# Patient Record
Sex: FEMALE | Race: WHITE | URBAN - METROPOLITAN AREA
[De-identification: names, ages, dates, MRNs, and addresses within clinical notes are randomized per-mention and may not be internally consistent; named-entity substitution may affect disease eponyms.]

---

## 2020-06-03 ENCOUNTER — CLAIMS CREATED FROM THE CLAIM WINDOW (OUTPATIENT)
Dept: URBAN - METROPOLITAN AREA TELEHEALTH 2 | Facility: TELEHEALTH | Age: 65
End: 2020-06-03
Payer: MEDICARE

## 2020-06-03 ENCOUNTER — OFFICE VISIT (OUTPATIENT)
Dept: URBAN - METROPOLITAN AREA TELEHEALTH 2 | Facility: TELEHEALTH | Age: 65
End: 2020-06-03

## 2020-06-03 DIAGNOSIS — K51.919 COLITIS, ULCERATIVE: ICD-10-CM

## 2020-06-03 DIAGNOSIS — K75.81 NASH WITH CIRRHOSIS: ICD-10-CM

## 2020-06-03 DIAGNOSIS — R19.7 DIARRHEA: ICD-10-CM

## 2020-06-03 PROCEDURE — G8427 DOCREV CUR MEDS BY ELIG CLIN: HCPCS | Performed by: INTERNAL MEDICINE

## 2020-06-03 PROCEDURE — G8417 CALC BMI ABV UP PARAM F/U: HCPCS | Performed by: INTERNAL MEDICINE

## 2020-06-03 PROCEDURE — G9903 PT SCRN TBCO ID AS NON USER: HCPCS | Performed by: INTERNAL MEDICINE

## 2020-06-03 PROCEDURE — 3017F COLORECTAL CA SCREEN DOC REV: CPT | Performed by: INTERNAL MEDICINE

## 2020-06-03 PROCEDURE — 99214 OFFICE O/P EST MOD 30 MIN: CPT | Performed by: INTERNAL MEDICINE

## 2020-06-03 RX ORDER — AMANTADINE HYDROCHLORIDE 100 MG/1
TAKE 1 TABLET (100 MG) BY ORAL ROUTE 2 TIMES PER DAY TABLET ORAL 2
Qty: 0 | Refills: 0 | COMMUNITY
Start: 1900-01-01

## 2020-06-03 RX ORDER — METOPROLOL SUCCINATE 25 MG/1
TAKE 1 TABLET (25 MG) BY ORAL ROUTE ONCE DAILY TABLET, EXTENDED RELEASE ORAL 1
Qty: 0 | Refills: 0 | COMMUNITY
Start: 1900-01-01

## 2020-06-03 RX ORDER — VEDOLIZUMAB 300 MG/5ML
INFUSE 300 MG OVER 30 MINUTE(S) BY INTRAVENOUS ROUTE EVERY 8 WEEKS INJECTION, POWDER, LYOPHILIZED, FOR SOLUTION INTRAVENOUS
Qty: 1 | Refills: 0 | COMMUNITY
Start: 1900-01-01

## 2020-06-03 RX ORDER — BUDESONIDE 9 MG/1
TAKE 1 TABLET (9 MG) BY ORAL ROUTE ONCE DAILY IN THE MORNING SWALLOWING WHOLE WITH WATER. DO NOT BREAK, CRUSH, DISSOLVE AND/OR CHEW. FOR 30 DAYS TABLET, EXTENDED RELEASE ORAL 1
Qty: 30 | Refills: 0 | COMMUNITY
Start: 2020-05-28 | End: 2020-06-27

## 2020-06-03 RX ORDER — USTEKINUMAB 130 MG/26ML
AS DIRECTED SOLUTION INTRAVENOUS
OUTPATIENT
Start: 2020-06-03

## 2020-06-03 RX ORDER — LEVOTHYROXINE SODIUM 112 UG/1
TAKE 1 TABLET (112 MCG) BY ORAL ROUTE ONCE DAILY TABLET ORAL 1
Qty: 0 | Refills: 0 | COMMUNITY
Start: 1900-01-01

## 2020-06-03 RX ORDER — LEVODOPA AND CARBIDOPA 145; 36.25 MG/1; MG/1
TAKE 1 CAPSULE BY ORAL ROUTE 3 TIMES PER DAY CAPSULE, EXTENDED RELEASE ORAL
Qty: 0 | Refills: 0 | COMMUNITY
Start: 1900-01-01

## 2020-06-03 RX ORDER — VENLAFAXINE HYDROCHLORIDE 75 MG/1
TAKE 1 CAPSULE (75 MG) BY ORAL ROUTE ONCE DAILY WITH FOOD CAPSULE, EXTENDED RELEASE ORAL 1
Qty: 0 | Refills: 0 | COMMUNITY
Start: 1900-01-01

## 2020-06-03 RX ORDER — MESALAMINE 1.2 G/1
TAKE 4 TABLETS (4.8 GRAM) BY ORAL ROUTE ONCE DAILY WITH A MEAL FOR 30 DAYS TABLET, DELAYED RELEASE ORAL 1
Qty: 120 | Refills: 2 | COMMUNITY
Start: 2020-05-11 | End: 2020-08-09

## 2020-06-03 RX ORDER — ROSUVASTATIN CALCIUM 20 MG
TAKE 1 TABLET (20 MG) BY ORAL ROUTE ONCE DAILY TABLET ORAL 1
Qty: 0 | Refills: 0 | COMMUNITY
Start: 1900-01-01

## 2020-06-03 NOTE — HPI-TODAY'S VISIT:
64 yo lady pt of Dr Rachael Carmen.  She is a pt of Dr Mlelisa Stinson who I'm seeing on an urgent basis.  She states was diagnosed with ulcerative colitis by Dr Josep Whitney in 10/2019. ( colonoscopy and path report not available for review) Her main symptoms were abdominal pain and diarrhea with mucus in stool. She denies blood in stool.  She was put on Prednisone.  She then saw Dr Stinson as she wanted to see a different physician and she is part of her support group - at [facility].  She saw Dr Stinson who changed her to mesalamine which has not helped. She takes 1.2 g 4 tabs once a day in the am. She has taken this for 16 days.  She is still having multiple BMs, she has had 5 BMs today so far, 10 yesterday. No blood in stool. "No matter what I eat, they come out like chocolate syrup". NO abdominal pain, "just a little cramping from time to time". She stopped prednisone about 3 weeks ago after a taper "as soon as I am off prednisone I flare again". On prednisone, symptoms are better. Has about 2 BMs a day, still liquid stools. rarely formed.  She also shares that she has had 4 infusions of Entyvvio. "I do not think that it has done much for me". Her next infusion is due in June. Per Dr Reveles's note, the plan was to switch to Stelara.  She states she has had stool studies done 3 times for c.diff " I was negative all 3 times". last time was March.  She is worried as she is scheduled for brain surgery on June 10th for direct brain stimulation. "I just dont want to have diarrhea in the hospital".  History Of Present Illness    Sept 2019 was diagnosed wtih steroid therapy and diet modification.  Pt reports that her symptoms began after gallbladder removed.  Pt reports that she has not had a good response. Pt reports that she is unclear if to whether the entire colon. Gas increased since starting the entyvio.  Patient reports that she has on a good day approx 2x/day. Pt reports that she was she just placed on the questran. Pt reports that she has been tested for April for deep brain stimulation .  No EIM

## 2020-06-05 ENCOUNTER — TELEPHONE ENCOUNTER (OUTPATIENT)
Dept: URBAN - METROPOLITAN AREA CLINIC 54 | Facility: CLINIC | Age: 65
End: 2020-06-05

## 2020-06-18 ENCOUNTER — LAB OUTSIDE AN ENCOUNTER (OUTPATIENT)
Dept: URBAN - METROPOLITAN AREA CLINIC 54 | Facility: CLINIC | Age: 65
End: 2020-06-18

## 2020-06-21 LAB
ACTIN (SMOOTH MUSCLE) ANTIBODY: 14
DEAMIDATED GLIADIN ABS, IGA: 13
DEAMIDATED GLIADIN ABS, IGG: 4
HCV AB: <0.1
HEP A AB, TOTAL: POSITIVE
HEP B CORE AB, TOT: NEGATIVE
HEPATITIS B SURF AB QUANT: 6.1
IMMUNOGLOBULIN A, QN, SERUM: 492
INTERPRETATION:: (no result)
MITOCHONDRIAL (M2) ANTIBODY: <20
T-TRANSGLUTAMINASE (TTG) IGA: 3
T-TRANSGLUTAMINASE (TTG) IGG: <2

## 2020-06-22 ENCOUNTER — WEB ENCOUNTER (OUTPATIENT)
Dept: URBAN - METROPOLITAN AREA CLINIC 54 | Facility: CLINIC | Age: 65
End: 2020-06-22

## 2020-06-24 ENCOUNTER — TELEPHONE ENCOUNTER (OUTPATIENT)
Dept: URBAN - METROPOLITAN AREA CLINIC 54 | Facility: CLINIC | Age: 65
End: 2020-06-24

## 2020-06-24 LAB
C DIFFICILE TOXIN GENE NAA: NEGATIVE
CALPROTECTIN, FECAL: 508
CAMPYLOBACTER CULTURE: (no result)
E COLI SHIGA TOXIN EIA: NEGATIVE
Lab: (no result)
OVA + PARASITE EXAM: (no result)
SALMONELLA/SHIGELLA SCREEN: (no result)
WHITE BLOOD CELLS (WBC), STOOL: (no result)

## 2020-07-07 ENCOUNTER — TELEPHONE ENCOUNTER (OUTPATIENT)
Dept: URBAN - METROPOLITAN AREA CLINIC 54 | Facility: CLINIC | Age: 65
End: 2020-07-07

## 2020-07-07 ENCOUNTER — OFFICE VISIT (OUTPATIENT)
Dept: URBAN - METROPOLITAN AREA TELEHEALTH 2 | Facility: TELEHEALTH | Age: 65
End: 2020-07-07

## 2020-07-07 ENCOUNTER — OFFICE VISIT (OUTPATIENT)
Dept: URBAN - METROPOLITAN AREA CLINIC 54 | Facility: CLINIC | Age: 65
End: 2020-07-07

## 2020-07-07 RX ORDER — USTEKINUMAB 130 MG/26ML
AS DIRECTED SOLUTION INTRAVENOUS
COMMUNITY
Start: 2020-06-03

## 2020-07-07 RX ORDER — ROSUVASTATIN CALCIUM 20 MG
TAKE 1 TABLET (20 MG) BY ORAL ROUTE ONCE DAILY TABLET ORAL 1
Qty: 0 | Refills: 0 | COMMUNITY
Start: 1900-01-01

## 2020-07-07 RX ORDER — LEVOTHYROXINE SODIUM 112 UG/1
TAKE 1 TABLET (112 MCG) BY ORAL ROUTE ONCE DAILY TABLET ORAL 1
Qty: 0 | Refills: 0 | COMMUNITY
Start: 1900-01-01

## 2020-07-07 RX ORDER — VENLAFAXINE HYDROCHLORIDE 75 MG/1
TAKE 1 CAPSULE (75 MG) BY ORAL ROUTE ONCE DAILY WITH FOOD CAPSULE, EXTENDED RELEASE ORAL 1
Qty: 0 | Refills: 0 | COMMUNITY
Start: 1900-01-01

## 2020-07-07 RX ORDER — VEDOLIZUMAB 300 MG/5ML
INFUSE 300 MG OVER 30 MINUTE(S) BY INTRAVENOUS ROUTE EVERY 8 WEEKS INJECTION, POWDER, LYOPHILIZED, FOR SOLUTION INTRAVENOUS
Qty: 1 | Refills: 0 | COMMUNITY
Start: 1900-01-01

## 2020-07-07 RX ORDER — AMANTADINE HYDROCHLORIDE 100 MG/1
TAKE 1 TABLET (100 MG) BY ORAL ROUTE 2 TIMES PER DAY TABLET ORAL 2
Qty: 0 | Refills: 0 | COMMUNITY
Start: 1900-01-01

## 2020-07-07 RX ORDER — MESALAMINE 1.2 G/1
TAKE 4 TABLETS (4.8 GRAM) BY ORAL ROUTE ONCE DAILY WITH A MEAL FOR 30 DAYS TABLET, DELAYED RELEASE ORAL 1
Qty: 120 | Refills: 2 | COMMUNITY
Start: 2020-05-11 | End: 2020-08-09

## 2020-07-07 RX ORDER — METOPROLOL SUCCINATE 25 MG/1
TAKE 1 TABLET (25 MG) BY ORAL ROUTE ONCE DAILY TABLET, EXTENDED RELEASE ORAL 1
Qty: 0 | Refills: 0 | COMMUNITY
Start: 1900-01-01

## 2020-07-07 RX ORDER — LEVODOPA AND CARBIDOPA 145; 36.25 MG/1; MG/1
TAKE 1 CAPSULE BY ORAL ROUTE 3 TIMES PER DAY CAPSULE, EXTENDED RELEASE ORAL
Qty: 0 | Refills: 0 | COMMUNITY
Start: 1900-01-01

## 2020-07-07 RX ORDER — METRONIDAZOLE 500 MG/1
1 TABLET TABLET ORAL TWICE A DAY
Qty: 20 | OUTPATIENT
Start: 2020-07-08 | End: 2020-07-18

## 2020-07-15 ENCOUNTER — OFFICE VISIT (OUTPATIENT)
Dept: URBAN - METROPOLITAN AREA CLINIC 54 | Facility: CLINIC | Age: 65
End: 2020-07-15

## 2020-07-17 ENCOUNTER — OFFICE VISIT (OUTPATIENT)
Dept: URBAN - METROPOLITAN AREA TELEHEALTH 2 | Facility: TELEHEALTH | Age: 65
End: 2020-07-17
Payer: MEDICARE

## 2020-07-17 DIAGNOSIS — K51.919 COLITIS, ULCERATIVE: ICD-10-CM

## 2020-07-17 DIAGNOSIS — R19.7 DIARRHEA: ICD-10-CM

## 2020-07-17 DIAGNOSIS — K75.81 NASH WITH CIRRHOSIS: ICD-10-CM

## 2020-07-17 PROCEDURE — G8427 DOCREV CUR MEDS BY ELIG CLIN: HCPCS | Performed by: INTERNAL MEDICINE

## 2020-07-17 PROCEDURE — 1036F TOBACCO NON-USER: CPT | Performed by: INTERNAL MEDICINE

## 2020-07-17 PROCEDURE — G9903 PT SCRN TBCO ID AS NON USER: HCPCS | Performed by: INTERNAL MEDICINE

## 2020-07-17 PROCEDURE — 3017F COLORECTAL CA SCREEN DOC REV: CPT | Performed by: INTERNAL MEDICINE

## 2020-07-17 PROCEDURE — 99214 OFFICE O/P EST MOD 30 MIN: CPT | Performed by: INTERNAL MEDICINE

## 2020-07-17 RX ORDER — MESALAMINE 1.2 G/1
TAKE 4 TABLETS (4.8 GRAM) BY ORAL ROUTE ONCE DAILY WITH A MEAL FOR 30 DAYS TABLET, DELAYED RELEASE ORAL 1
Qty: 120 | Refills: 2 | COMMUNITY
Start: 2020-05-11 | End: 2020-08-09

## 2020-07-17 RX ORDER — METOPROLOL SUCCINATE 25 MG/1
TAKE 1 TABLET (25 MG) BY ORAL ROUTE ONCE DAILY TABLET, EXTENDED RELEASE ORAL 1
Qty: 0 | Refills: 0 | COMMUNITY
Start: 1900-01-01

## 2020-07-17 RX ORDER — LEVOTHYROXINE SODIUM 112 UG/1
TAKE 1 TABLET (112 MCG) BY ORAL ROUTE ONCE DAILY TABLET ORAL 1
Qty: 0 | Refills: 0 | COMMUNITY
Start: 1900-01-01

## 2020-07-17 RX ORDER — METRONIDAZOLE 500 MG/1
1 TABLET TABLET ORAL TWICE A DAY
Qty: 20 | Status: ACTIVE | COMMUNITY
Start: 2020-07-08 | End: 2020-07-18

## 2020-07-17 RX ORDER — VEDOLIZUMAB 300 MG/5ML
INFUSE 300 MG OVER 30 MINUTE(S) BY INTRAVENOUS ROUTE EVERY 8 WEEKS INJECTION, POWDER, LYOPHILIZED, FOR SOLUTION INTRAVENOUS
Qty: 1 | Refills: 0 | COMMUNITY
Start: 1900-01-01

## 2020-07-17 RX ORDER — ROSUVASTATIN CALCIUM 20 MG
TAKE 1 TABLET (20 MG) BY ORAL ROUTE ONCE DAILY TABLET ORAL 1
Qty: 0 | Refills: 0 | COMMUNITY
Start: 1900-01-01

## 2020-07-17 RX ORDER — AMANTADINE HYDROCHLORIDE 100 MG/1
TAKE 1 TABLET (100 MG) BY ORAL ROUTE 2 TIMES PER DAY TABLET ORAL 2
Qty: 0 | Refills: 0 | COMMUNITY
Start: 1900-01-01

## 2020-07-17 RX ORDER — USTEKINUMAB 130 MG/26ML
AS DIRECTED SOLUTION INTRAVENOUS
COMMUNITY
Start: 2020-06-03

## 2020-07-17 RX ORDER — VENLAFAXINE HYDROCHLORIDE 75 MG/1
TAKE 1 CAPSULE (75 MG) BY ORAL ROUTE ONCE DAILY WITH FOOD CAPSULE, EXTENDED RELEASE ORAL 1
Qty: 0 | Refills: 0 | COMMUNITY
Start: 1900-01-01

## 2020-07-17 RX ORDER — LEVODOPA AND CARBIDOPA 145; 36.25 MG/1; MG/1
TAKE 1 CAPSULE BY ORAL ROUTE 3 TIMES PER DAY CAPSULE, EXTENDED RELEASE ORAL
Qty: 0 | Refills: 0 | COMMUNITY
Start: 1900-01-01

## 2020-07-27 ENCOUNTER — TELEPHONE ENCOUNTER (OUTPATIENT)
Dept: URBAN - METROPOLITAN AREA CLINIC 54 | Facility: CLINIC | Age: 65
End: 2020-07-27

## 2020-07-29 ENCOUNTER — WEB ENCOUNTER (OUTPATIENT)
Dept: URBAN - METROPOLITAN AREA CLINIC 92 | Facility: CLINIC | Age: 65
End: 2020-07-29

## 2020-08-13 ENCOUNTER — OFFICE VISIT (OUTPATIENT)
Dept: URBAN - METROPOLITAN AREA CLINIC 97 | Facility: CLINIC | Age: 65
End: 2020-08-13

## 2020-08-19 ENCOUNTER — OFFICE VISIT (OUTPATIENT)
Dept: URBAN - METROPOLITAN AREA CLINIC 97 | Facility: CLINIC | Age: 65
End: 2020-08-19

## 2020-08-20 ENCOUNTER — OFFICE VISIT (OUTPATIENT)
Dept: URBAN - METROPOLITAN AREA CLINIC 97 | Facility: CLINIC | Age: 65
End: 2020-08-20

## 2020-08-24 ENCOUNTER — CLAIMS CREATED FROM THE CLAIM WINDOW (OUTPATIENT)
Dept: URBAN - METROPOLITAN AREA CLINIC 97 | Facility: CLINIC | Age: 65
End: 2020-08-24
Payer: MEDICARE

## 2020-08-24 ENCOUNTER — CLAIMS CREATED FROM THE CLAIM WINDOW (OUTPATIENT)
Dept: URBAN - METROPOLITAN AREA CLINIC 97 | Facility: CLINIC | Age: 65
End: 2020-08-24

## 2020-08-24 ENCOUNTER — OFFICE VISIT (OUTPATIENT)
Dept: URBAN - METROPOLITAN AREA CLINIC 97 | Facility: CLINIC | Age: 65
End: 2020-08-24

## 2020-08-24 DIAGNOSIS — K51.80 CHRONIC PANCOLONIC ULCERATIVE COLITIS: ICD-10-CM

## 2020-08-24 PROCEDURE — 96413 CHEMO IV INFUSION 1 HR: CPT | Performed by: INTERNAL MEDICINE

## 2020-08-24 RX ORDER — LEVODOPA AND CARBIDOPA 145; 36.25 MG/1; MG/1
TAKE 1 CAPSULE BY ORAL ROUTE 3 TIMES PER DAY CAPSULE, EXTENDED RELEASE ORAL
Qty: 0 | Refills: 0 | COMMUNITY
Start: 1900-01-01

## 2020-08-24 RX ORDER — VENLAFAXINE HYDROCHLORIDE 75 MG/1
TAKE 1 CAPSULE (75 MG) BY ORAL ROUTE ONCE DAILY WITH FOOD CAPSULE, EXTENDED RELEASE ORAL 1
Qty: 0 | Refills: 0 | COMMUNITY
Start: 1900-01-01

## 2020-08-24 RX ORDER — METOPROLOL SUCCINATE 25 MG/1
TAKE 1 TABLET (25 MG) BY ORAL ROUTE ONCE DAILY TABLET, EXTENDED RELEASE ORAL 1
Qty: 0 | Refills: 0 | COMMUNITY
Start: 1900-01-01

## 2020-08-24 RX ORDER — AMANTADINE HYDROCHLORIDE 100 MG/1
TAKE 1 TABLET (100 MG) BY ORAL ROUTE 2 TIMES PER DAY TABLET ORAL 2
Qty: 0 | Refills: 0 | COMMUNITY
Start: 1900-01-01

## 2020-08-24 RX ORDER — ROSUVASTATIN CALCIUM 20 MG
TAKE 1 TABLET (20 MG) BY ORAL ROUTE ONCE DAILY TABLET ORAL 1
Qty: 0 | Refills: 0 | COMMUNITY
Start: 1900-01-01

## 2020-08-24 RX ORDER — LEVOTHYROXINE SODIUM 112 UG/1
TAKE 1 TABLET (112 MCG) BY ORAL ROUTE ONCE DAILY TABLET ORAL 1
Qty: 0 | Refills: 0 | COMMUNITY
Start: 1900-01-01

## 2020-08-24 RX ORDER — USTEKINUMAB 130 MG/26ML
AS DIRECTED SOLUTION INTRAVENOUS
COMMUNITY
Start: 2020-06-03

## 2020-08-24 RX ORDER — VEDOLIZUMAB 300 MG/5ML
INFUSE 300 MG OVER 30 MINUTE(S) BY INTRAVENOUS ROUTE EVERY 8 WEEKS INJECTION, POWDER, LYOPHILIZED, FOR SOLUTION INTRAVENOUS
Qty: 1 | Refills: 0 | COMMUNITY
Start: 1900-01-01

## 2020-09-23 ENCOUNTER — WEB ENCOUNTER (OUTPATIENT)
Dept: URBAN - METROPOLITAN AREA CLINIC 92 | Facility: CLINIC | Age: 65
End: 2020-09-23

## 2020-09-23 ENCOUNTER — TELEPHONE ENCOUNTER (OUTPATIENT)
Dept: URBAN - METROPOLITAN AREA CLINIC 54 | Facility: CLINIC | Age: 65
End: 2020-09-23

## 2020-09-23 ENCOUNTER — OFFICE VISIT (OUTPATIENT)
Dept: URBAN - METROPOLITAN AREA SURGERY CENTER 14 | Facility: SURGERY CENTER | Age: 65
End: 2020-09-23
Payer: MEDICARE

## 2020-09-23 DIAGNOSIS — K75.81 CHRONIC STEATOHEPATITIS, NONALCOHOLIC: ICD-10-CM

## 2020-09-23 PROCEDURE — 992 APS NON BILLABLE: Performed by: INTERNAL MEDICINE

## 2020-09-23 RX ORDER — LEVODOPA AND CARBIDOPA 145; 36.25 MG/1; MG/1
TAKE 1 CAPSULE BY ORAL ROUTE 3 TIMES PER DAY CAPSULE, EXTENDED RELEASE ORAL
Qty: 0 | Refills: 0 | COMMUNITY
Start: 1900-01-01

## 2020-09-23 RX ORDER — USTEKINUMAB 90 MG/ML
AS DIRECTED EVERY 8 WEEKS INJECTION, SOLUTION SUBCUTANEOUS
Qty: 1 | Refills: 6 | OUTPATIENT

## 2020-09-23 RX ORDER — LEVOTHYROXINE SODIUM 112 UG/1
TAKE 1 TABLET (112 MCG) BY ORAL ROUTE ONCE DAILY TABLET ORAL 1
Qty: 0 | Refills: 0 | COMMUNITY
Start: 1900-01-01

## 2020-09-23 RX ORDER — VEDOLIZUMAB 300 MG/5ML
INFUSE 300 MG OVER 30 MINUTE(S) BY INTRAVENOUS ROUTE EVERY 8 WEEKS INJECTION, POWDER, LYOPHILIZED, FOR SOLUTION INTRAVENOUS
Qty: 1 | Refills: 0 | COMMUNITY
Start: 1900-01-01

## 2020-09-23 RX ORDER — AMANTADINE HYDROCHLORIDE 100 MG/1
TAKE 1 TABLET (100 MG) BY ORAL ROUTE 2 TIMES PER DAY TABLET ORAL 2
Qty: 0 | Refills: 0 | COMMUNITY
Start: 1900-01-01

## 2020-09-23 RX ORDER — ROSUVASTATIN CALCIUM 20 MG
TAKE 1 TABLET (20 MG) BY ORAL ROUTE ONCE DAILY TABLET ORAL 1
Qty: 0 | Refills: 0 | COMMUNITY
Start: 1900-01-01

## 2020-09-23 RX ORDER — METOPROLOL SUCCINATE 25 MG/1
TAKE 1 TABLET (25 MG) BY ORAL ROUTE ONCE DAILY TABLET, EXTENDED RELEASE ORAL 1
Qty: 0 | Refills: 0 | COMMUNITY
Start: 1900-01-01

## 2020-09-23 RX ORDER — VENLAFAXINE HYDROCHLORIDE 75 MG/1
TAKE 1 CAPSULE (75 MG) BY ORAL ROUTE ONCE DAILY WITH FOOD CAPSULE, EXTENDED RELEASE ORAL 1
Qty: 0 | Refills: 0 | COMMUNITY
Start: 1900-01-01

## 2020-09-23 RX ORDER — USTEKINUMAB 130 MG/26ML
AS DIRECTED SOLUTION INTRAVENOUS
COMMUNITY
Start: 2020-06-03

## 2020-09-29 ENCOUNTER — TELEPHONE ENCOUNTER (OUTPATIENT)
Dept: URBAN - METROPOLITAN AREA CLINIC 92 | Facility: CLINIC | Age: 65
End: 2020-09-29

## 2020-10-09 ENCOUNTER — TELEPHONE ENCOUNTER (OUTPATIENT)
Dept: URBAN - METROPOLITAN AREA CLINIC 54 | Facility: CLINIC | Age: 65
End: 2020-10-09

## 2020-10-12 ENCOUNTER — WEB ENCOUNTER (OUTPATIENT)
Dept: URBAN - METROPOLITAN AREA CLINIC 54 | Facility: CLINIC | Age: 65
End: 2020-10-12

## 2020-10-15 ENCOUNTER — LAB OUTSIDE AN ENCOUNTER (OUTPATIENT)
Dept: URBAN - METROPOLITAN AREA CLINIC 52 | Facility: CLINIC | Age: 65
End: 2020-10-15

## 2020-10-15 ENCOUNTER — OFFICE VISIT (OUTPATIENT)
Dept: URBAN - METROPOLITAN AREA CLINIC 52 | Facility: CLINIC | Age: 65
End: 2020-10-15
Payer: MEDICARE

## 2020-10-15 ENCOUNTER — WEB ENCOUNTER (OUTPATIENT)
Dept: URBAN - METROPOLITAN AREA CLINIC 52 | Facility: CLINIC | Age: 65
End: 2020-10-15

## 2020-10-15 DIAGNOSIS — G20 PARKINSON DISEASE: ICD-10-CM

## 2020-10-15 DIAGNOSIS — R10.11 RUQ PAIN: ICD-10-CM

## 2020-10-15 DIAGNOSIS — R93.5 ABNORMAL CT OF THE ABDOMEN: ICD-10-CM

## 2020-10-15 DIAGNOSIS — K74.60 CIRRHOSIS: ICD-10-CM

## 2020-10-15 DIAGNOSIS — K51.90 ULCERATIVE COLITIS: ICD-10-CM

## 2020-10-15 PROCEDURE — G8419 CALC BMI OUT NRM PARAM NOF/U: HCPCS | Performed by: INTERNAL MEDICINE

## 2020-10-15 PROCEDURE — 1036F TOBACCO NON-USER: CPT | Performed by: INTERNAL MEDICINE

## 2020-10-15 PROCEDURE — 3017F COLORECTAL CA SCREEN DOC REV: CPT | Performed by: INTERNAL MEDICINE

## 2020-10-15 PROCEDURE — G8427 DOCREV CUR MEDS BY ELIG CLIN: HCPCS | Performed by: INTERNAL MEDICINE

## 2020-10-15 PROCEDURE — 99214 OFFICE O/P EST MOD 30 MIN: CPT | Performed by: INTERNAL MEDICINE

## 2020-10-15 RX ORDER — VEDOLIZUMAB 300 MG/5ML
INFUSE 300 MG OVER 30 MINUTE(S) BY INTRAVENOUS ROUTE EVERY 8 WEEKS INJECTION, POWDER, LYOPHILIZED, FOR SOLUTION INTRAVENOUS
Qty: 1 | Refills: 0 | Status: DISCONTINUED | COMMUNITY
Start: 1900-01-01

## 2020-10-15 RX ORDER — USTEKINUMAB 90 MG/ML
AS DIRECTED EVERY 8 WEEKS INJECTION, SOLUTION SUBCUTANEOUS
Qty: 1 | Refills: 6 | Status: ACTIVE | COMMUNITY

## 2020-10-15 RX ORDER — AMANTADINE HYDROCHLORIDE 100 MG/1
TAKE 1 TABLET (100 MG) BY ORAL ROUTE 2 TIMES PER DAY TABLET ORAL 2
Qty: 0 | Refills: 0 | Status: DISCONTINUED | COMMUNITY
Start: 1900-01-01

## 2020-10-15 RX ORDER — VENLAFAXINE HYDROCHLORIDE 75 MG/1
TAKE 1 CAPSULE (75 MG) BY ORAL ROUTE ONCE DAILY WITH FOOD CAPSULE, EXTENDED RELEASE ORAL 1
Qty: 0 | Refills: 0 | COMMUNITY
Start: 1900-01-01

## 2020-10-15 RX ORDER — USTEKINUMAB 130 MG/26ML
AS DIRECTED SOLUTION INTRAVENOUS
Status: ACTIVE | COMMUNITY
Start: 2020-06-03

## 2020-10-15 RX ORDER — LEVODOPA AND CARBIDOPA 145; 36.25 MG/1; MG/1
TAKE 1 CAPSULE BY ORAL ROUTE 3 TIMES PER DAY CAPSULE, EXTENDED RELEASE ORAL
Qty: 0 | Refills: 0 | Status: DISCONTINUED | COMMUNITY
Start: 1900-01-01

## 2020-10-15 RX ORDER — METOPROLOL SUCCINATE 25 MG/1
TAKE 1 TABLET (25 MG) BY ORAL ROUTE ONCE DAILY TABLET, EXTENDED RELEASE ORAL 1
Qty: 0 | Refills: 0 | Status: ACTIVE | COMMUNITY
Start: 1900-01-01

## 2020-10-15 RX ORDER — ROSUVASTATIN CALCIUM 20 MG
TAKE 1 TABLET (20 MG) BY ORAL ROUTE ONCE DAILY TABLET ORAL 1
Qty: 0 | Refills: 0 | Status: ACTIVE | COMMUNITY
Start: 1900-01-01

## 2020-10-15 RX ORDER — LEVOTHYROXINE SODIUM 112 UG/1
TAKE 1 TABLET (112 MCG) BY ORAL ROUTE ONCE DAILY TABLET ORAL 1
Qty: 0 | Refills: 0 | Status: ACTIVE | COMMUNITY
Start: 1900-01-01

## 2020-10-15 NOTE — HPI-TODAY'S VISIT:
S- 10/2019-Colonoscopy Dr Whitney-Ulcerative colitis, patchy. 12/2019-Hospitalized.  Entyvio started. She had 4 infusions and did not improve per patient.  This August got 1st dose Stelara + 1st SubQ injection is due now.Pt states her colitis is now better with no diarrhea, bloody stool, abd pain or fever. 1 BM/d CT last year showed findings of cirrhosis. Had Deep Brain Stimulator inserted this year for Parkinsons. EGD was scheduled for varices screening but canceled due to brain stimulator wire issue. Has variable pain in RUQ which is better now.

## 2020-10-15 NOTE — PHYSICAL EXAM GASTROINTESTINAL
Abdomen , soft, nontender, distended or obese, no guarding or rigidity , no masses palpable , normal bowel sounds , Liver and Spleen , no hepatomegaly present , no hepatosplenomegaly , liver nontender , spleen not palpable

## 2020-10-16 LAB
A/G RATIO: 1.7
ALBUMIN: 4.5
ALKALINE PHOSPHATASE: 104
ALT (SGPT): 26
AMMONIA, PLASMA: 53
AST (SGOT): 29
BASO (ABSOLUTE): 0.1
BASOS: 1
BILIRUBIN, TOTAL: 0.5
BUN/CREATININE RATIO: 15
BUN: 9
C-REACTIVE PROTEIN, QUANT: 2
CALCIUM: 9.4
CARBON DIOXIDE, TOTAL: 24
CHLORIDE: 104
CREATININE: 0.61
EGFR IF AFRICN AM: 112
EGFR IF NONAFRICN AM: 97
EOS (ABSOLUTE): 0.3
EOS: 5
GLOBULIN, TOTAL: 2.7
GLUCOSE: 84
HEMATOCRIT: 38.6
HEMATOLOGY COMMENTS:: (no result)
HEMOGLOBIN: 13.4
IMMATURE CELLS: (no result)
IMMATURE GRANS (ABS): 0
IMMATURE GRANULOCYTES: 0
INR: 1
LYMPHS (ABSOLUTE): 2.3
LYMPHS: 35
MCH: 29.4
MCHC: 34.7
MCV: 85
MONOCYTES(ABSOLUTE): 0.7
MONOCYTES: 10
NEUTROPHILS (ABSOLUTE): 3.2
NEUTROPHILS: 49
NRBC: (no result)
PLATELETS: 209
POTASSIUM: 4.1
PROTEIN, TOTAL: 7.2
PROTHROMBIN TIME: 10.8
RBC: 4.56
RDW: 13.3
SODIUM: 142
WBC: 6.5

## 2020-11-10 ENCOUNTER — OFFICE VISIT (OUTPATIENT)
Dept: URBAN - METROPOLITAN AREA MEDICAL CENTER 34 | Facility: MEDICAL CENTER | Age: 65
End: 2020-11-10
Payer: MEDICARE

## 2020-11-10 DIAGNOSIS — K29.30 CHRONIC SUPERFICIAL GASTRITIS: ICD-10-CM

## 2020-11-10 DIAGNOSIS — Z12.11 COLON CANCER SCREENING: ICD-10-CM

## 2020-11-10 PROCEDURE — 992 NON-BILLABLE: Performed by: INTERNAL MEDICINE

## 2020-11-10 PROCEDURE — 43239 EGD BIOPSY SINGLE/MULTIPLE: CPT | Performed by: INTERNAL MEDICINE

## 2020-11-20 ENCOUNTER — WEB ENCOUNTER (OUTPATIENT)
Dept: URBAN - METROPOLITAN AREA CLINIC 54 | Facility: CLINIC | Age: 65
End: 2020-11-20

## 2020-11-23 ENCOUNTER — WEB ENCOUNTER (OUTPATIENT)
Dept: URBAN - METROPOLITAN AREA CLINIC 54 | Facility: CLINIC | Age: 65
End: 2020-11-23

## 2020-11-24 ENCOUNTER — TELEPHONE ENCOUNTER (OUTPATIENT)
Dept: URBAN - NONMETROPOLITAN AREA CLINIC 4 | Facility: CLINIC | Age: 65
End: 2020-11-24

## 2020-11-30 ENCOUNTER — WEB ENCOUNTER (OUTPATIENT)
Dept: URBAN - METROPOLITAN AREA CLINIC 54 | Facility: CLINIC | Age: 65
End: 2020-11-30

## 2020-12-04 ENCOUNTER — WEB ENCOUNTER (OUTPATIENT)
Dept: URBAN - METROPOLITAN AREA CLINIC 54 | Facility: CLINIC | Age: 65
End: 2020-12-04

## 2020-12-16 ENCOUNTER — WEB ENCOUNTER (OUTPATIENT)
Dept: URBAN - METROPOLITAN AREA CLINIC 54 | Facility: CLINIC | Age: 65
End: 2020-12-16

## 2020-12-23 ENCOUNTER — TELEPHONE ENCOUNTER (OUTPATIENT)
Dept: URBAN - METROPOLITAN AREA CLINIC 54 | Facility: CLINIC | Age: 65
End: 2020-12-23

## 2020-12-28 ENCOUNTER — WEB ENCOUNTER (OUTPATIENT)
Dept: URBAN - METROPOLITAN AREA CLINIC 54 | Facility: CLINIC | Age: 65
End: 2020-12-28

## 2020-12-29 ENCOUNTER — WEB ENCOUNTER (OUTPATIENT)
Dept: URBAN - NONMETROPOLITAN AREA CLINIC 4 | Facility: CLINIC | Age: 65
End: 2020-12-29

## 2020-12-29 ENCOUNTER — OFFICE VISIT (OUTPATIENT)
Dept: URBAN - NONMETROPOLITAN AREA CLINIC 4 | Facility: CLINIC | Age: 65
End: 2020-12-29
Payer: MEDICARE

## 2020-12-29 VITALS
TEMPERATURE: 98.1 F | SYSTOLIC BLOOD PRESSURE: 143 MMHG | DIASTOLIC BLOOD PRESSURE: 69 MMHG | HEIGHT: 62 IN | BODY MASS INDEX: 35.81 KG/M2 | WEIGHT: 194.6 LBS | HEART RATE: 72 BPM

## 2020-12-29 DIAGNOSIS — G20 PARKINSON DISEASE: ICD-10-CM

## 2020-12-29 DIAGNOSIS — K51.90 ULCERATIVE COLITIS: ICD-10-CM

## 2020-12-29 DIAGNOSIS — M25.50 ARTHRALGIA: ICD-10-CM

## 2020-12-29 DIAGNOSIS — E55.9 VITAMIN D DEFICIENCY: ICD-10-CM

## 2020-12-29 DIAGNOSIS — K74.60 CIRRHOSIS: ICD-10-CM

## 2020-12-29 PROBLEM — 34713006 VITAMIN D DEFICIENCY: Status: ACTIVE | Noted: 2020-12-29

## 2020-12-29 PROBLEM — 64766004 ULCERATIVE COLITIS: Status: ACTIVE | Noted: 2020-10-15

## 2020-12-29 PROCEDURE — 99214 OFFICE O/P EST MOD 30 MIN: CPT | Performed by: INTERNAL MEDICINE

## 2020-12-29 PROCEDURE — G9903 PT SCRN TBCO ID AS NON USER: HCPCS | Performed by: INTERNAL MEDICINE

## 2020-12-29 PROCEDURE — G8482 FLU IMMUNIZE ORDER/ADMIN: HCPCS | Performed by: INTERNAL MEDICINE

## 2020-12-29 PROCEDURE — G8417 CALC BMI ABV UP PARAM F/U: HCPCS | Performed by: INTERNAL MEDICINE

## 2020-12-29 PROCEDURE — 1036F TOBACCO NON-USER: CPT | Performed by: INTERNAL MEDICINE

## 2020-12-29 PROCEDURE — 3017F COLORECTAL CA SCREEN DOC REV: CPT | Performed by: INTERNAL MEDICINE

## 2020-12-29 PROCEDURE — G8427 DOCREV CUR MEDS BY ELIG CLIN: HCPCS | Performed by: INTERNAL MEDICINE

## 2020-12-29 RX ORDER — METOPROLOL SUCCINATE 25 MG/1
TAKE 1 TABLET (25 MG) BY ORAL ROUTE ONCE DAILY TABLET, EXTENDED RELEASE ORAL 1
Qty: 0 | Refills: 0 | Status: ACTIVE | COMMUNITY
Start: 1900-01-01

## 2020-12-29 RX ORDER — ROSUVASTATIN CALCIUM 20 MG
TAKE 1 TABLET (20 MG) BY ORAL ROUTE ONCE DAILY TABLET ORAL 1
Qty: 0 | Refills: 0 | Status: ACTIVE | COMMUNITY
Start: 1900-01-01

## 2020-12-29 RX ORDER — USTEKINUMAB 90 MG/ML
AS DIRECTED EVERY 8 WEEKS INJECTION, SOLUTION SUBCUTANEOUS
Qty: 1 | Refills: 6 | Status: ACTIVE | COMMUNITY

## 2020-12-29 RX ORDER — SULFASALAZINE 500 MG/1
3 TABS TABLET ORAL BID
Qty: 180 TABLET | Refills: 2 | OUTPATIENT

## 2020-12-29 RX ORDER — LEVOTHYROXINE SODIUM 112 UG/1
TAKE 1 TABLET (112 MCG) BY ORAL ROUTE ONCE DAILY TABLET ORAL 1
Qty: 0 | Refills: 0 | Status: ACTIVE | COMMUNITY
Start: 1900-01-01

## 2020-12-29 RX ORDER — USTEKINUMAB 130 MG/26ML
AS DIRECTED SOLUTION INTRAVENOUS
Status: ACTIVE | COMMUNITY
Start: 2020-06-03

## 2020-12-29 RX ORDER — FOLIC ACID 1 MG/1
1 TABLET TABLET ORAL ONCE A DAY
Qty: 30 TABLET | Refills: 3 | OUTPATIENT

## 2020-12-29 RX ORDER — VENLAFAXINE HYDROCHLORIDE 75 MG/1
TAKE 1 CAPSULE (75 MG) BY ORAL ROUTE ONCE DAILY WITH FOOD CAPSULE, EXTENDED RELEASE ORAL 1
Qty: 0 | Refills: 0 | COMMUNITY
Start: 1900-01-01

## 2020-12-31 ENCOUNTER — TELEPHONE ENCOUNTER (OUTPATIENT)
Dept: URBAN - METROPOLITAN AREA CLINIC 54 | Facility: CLINIC | Age: 65
End: 2020-12-31

## 2021-01-01 PROBLEM — 442685003 NASH - NONALCOHOLIC STEATOHEPATITIS: Status: ACTIVE | Noted: 2020-06-03

## 2021-01-04 ENCOUNTER — OFFICE VISIT (OUTPATIENT)
Dept: URBAN - METROPOLITAN AREA CLINIC 54 | Facility: CLINIC | Age: 66
End: 2021-01-04

## 2021-01-19 ENCOUNTER — TELEPHONE ENCOUNTER (OUTPATIENT)
Dept: URBAN - METROPOLITAN AREA CLINIC 23 | Facility: CLINIC | Age: 66
End: 2021-01-19

## 2021-02-11 ENCOUNTER — WEB ENCOUNTER (OUTPATIENT)
Dept: URBAN - METROPOLITAN AREA CLINIC 54 | Facility: CLINIC | Age: 66
End: 2021-02-11

## 2021-02-12 ENCOUNTER — WEB ENCOUNTER (OUTPATIENT)
Dept: URBAN - METROPOLITAN AREA CLINIC 54 | Facility: CLINIC | Age: 66
End: 2021-02-12

## 2021-02-12 RX ORDER — PREGABALIN 200 MG/1
1 CAPSULE 1 TO 3 HOURS BEFORE BEDTIME CAPSULE ORAL ONCE A DAY
Qty: 30 CAPSULE | Refills: 0 | OUTPATIENT

## 2021-02-12 RX ORDER — DULOXETINE HYDROCHLORIDE 30 MG/1
1 CAPSULE CAPSULE, DELAYED RELEASE ORAL ONCE A DAY
Qty: 30 CAPSULE | Refills: 0 | OUTPATIENT
Start: 2021-02-17

## 2021-02-17 ENCOUNTER — WEB ENCOUNTER (OUTPATIENT)
Dept: URBAN - METROPOLITAN AREA CLINIC 54 | Facility: CLINIC | Age: 66
End: 2021-02-17

## 2021-02-18 ENCOUNTER — WEB ENCOUNTER (OUTPATIENT)
Dept: URBAN - METROPOLITAN AREA CLINIC 54 | Facility: CLINIC | Age: 66
End: 2021-02-18

## 2021-03-14 ENCOUNTER — ERX REFILL RESPONSE (OUTPATIENT)
Dept: URBAN - METROPOLITAN AREA CLINIC 54 | Facility: CLINIC | Age: 66
End: 2021-03-14

## 2021-03-14 RX ORDER — DULOXETINE HYDROCHLORIDE 30 MG/1
1 CAPSULE CAPSULE, DELAYED RELEASE ORAL ONCE A DAY
Qty: 30 | Refills: 3

## 2021-07-06 ENCOUNTER — OFFICE VISIT (OUTPATIENT)
Dept: URBAN - NONMETROPOLITAN AREA CLINIC 4 | Facility: CLINIC | Age: 66
End: 2021-07-06
Payer: MEDICARE

## 2021-07-06 ENCOUNTER — WEB ENCOUNTER (OUTPATIENT)
Dept: URBAN - NONMETROPOLITAN AREA CLINIC 4 | Facility: CLINIC | Age: 66
End: 2021-07-06

## 2021-07-06 DIAGNOSIS — R29.818 NEUROLOGIC ABNORMALITY: ICD-10-CM

## 2021-07-06 DIAGNOSIS — K51.919 COLITIS, ULCERATIVE: ICD-10-CM

## 2021-07-06 DIAGNOSIS — K74.60 CIRRHOSIS: ICD-10-CM

## 2021-07-06 DIAGNOSIS — G20 PARKINSON DISEASE: ICD-10-CM

## 2021-07-06 DIAGNOSIS — R19.7 DIARRHEA: ICD-10-CM

## 2021-07-06 PROBLEM — 49049000 PARKINSON DISEASE: Status: ACTIVE | Noted: 2020-10-15

## 2021-07-06 PROBLEM — 62315008 DIARRHEA: Status: ACTIVE | Noted: 2020-06-03

## 2021-07-06 PROBLEM — 255417007 CIRRHOTIC: Status: ACTIVE | Noted: 2020-10-12

## 2021-07-06 PROCEDURE — 99214 OFFICE O/P EST MOD 30 MIN: CPT | Performed by: INTERNAL MEDICINE

## 2021-07-06 RX ORDER — LEVOTHYROXINE SODIUM 112 UG/1
TAKE 1 TABLET (112 MCG) BY ORAL ROUTE ONCE DAILY TABLET ORAL 1
Qty: 0 | Refills: 0 | Status: DISCONTINUED | COMMUNITY

## 2021-07-06 RX ORDER — ROSUVASTATIN CALCIUM 20 MG
TAKE 1 TABLET (20 MG) BY ORAL ROUTE ONCE DAILY TABLET ORAL 1
Qty: 0 | Refills: 0 | Status: ACTIVE | COMMUNITY

## 2021-07-06 RX ORDER — USTEKINUMAB 45 MG/.5ML
AS DIRECTED INJECTION, SOLUTION SUBCUTANEOUS
Status: ACTIVE | COMMUNITY

## 2021-07-06 RX ORDER — LEVOTHYROXINE SODIUM 0.11 MG/1
1 TABLET IN THE MORNING ON AN EMPTY STOMACH TABLET ORAL ONCE A DAY
Status: ACTIVE | COMMUNITY

## 2021-07-06 RX ORDER — FOLIC ACID 1 MG/1
1 TABLET TABLET ORAL ONCE A DAY
Qty: 30 TABLET | Refills: 3 | Status: DISCONTINUED | COMMUNITY

## 2021-07-06 RX ORDER — PREGABALIN 200 MG/1
1 CAPSULE 1 TO 3 HOURS BEFORE BEDTIME CAPSULE ORAL ONCE A DAY
Qty: 30 CAPSULE | Refills: 0 | Status: DISCONTINUED | COMMUNITY

## 2021-07-06 RX ORDER — USTEKINUMAB 90 MG/ML
AS DIRECTED EVERY 8 WEEKS INJECTION, SOLUTION SUBCUTANEOUS
Qty: 1 | Refills: 6 | Status: DISCONTINUED | COMMUNITY

## 2021-07-06 RX ORDER — CICLESONIDE 160 UG/1
1 PUFF AEROSOL, METERED RESPIRATORY (INHALATION) TWICE A DAY
Status: ACTIVE | COMMUNITY

## 2021-07-06 RX ORDER — DULOXETINE HYDROCHLORIDE 30 MG/1
1 CAPSULE CAPSULE, DELAYED RELEASE ORAL ONCE A DAY
Qty: 30 | Refills: 3 | Status: DISCONTINUED | COMMUNITY

## 2021-07-06 RX ORDER — ALBUTEROL SULFATE 90 UG/1
1 PUFF AS NEEDED AEROSOL, METERED RESPIRATORY (INHALATION)
Status: ACTIVE | COMMUNITY

## 2021-07-06 RX ORDER — VENLAFAXINE HYDROCHLORIDE 75 MG/1
TAKE 1 CAPSULE (75 MG) BY ORAL ROUTE ONCE DAILY WITH FOOD CAPSULE, EXTENDED RELEASE ORAL 1
Qty: 0 | Refills: 0 | Status: ACTIVE | COMMUNITY

## 2021-07-06 RX ORDER — CLONAZEPAM 0.5 MG/1
1 TABLET AT BEDTIME TABLET ORAL ONCE A DAY
Status: ACTIVE | COMMUNITY

## 2021-07-06 RX ORDER — SULFASALAZINE 500 MG/1
3 TABS TABLET ORAL BID
Qty: 180 TABLET | Refills: 2 | Status: DISCONTINUED | COMMUNITY

## 2021-07-06 RX ORDER — LEVOCETIRIZINE DIHYDROCHLORIDE 5 MG/1
1 TABLET IN THE EVENING TABLET ORAL ONCE A DAY
Status: ACTIVE | COMMUNITY

## 2021-07-06 RX ORDER — METOPROLOL SUCCINATE 25 MG/1
TAKE 1 TABLET (25 MG) BY ORAL ROUTE ONCE DAILY TABLET, EXTENDED RELEASE ORAL 1
Qty: 0 | Refills: 0 | Status: ACTIVE | COMMUNITY

## 2021-07-06 NOTE — HPI-TODAY'S VISIT:
S- 10/2019-Colonoscopy Dr Whitney-Ulcerative colitis, patchy. 12/2019-Hospitalized.  Entyvio started. She had 4 infusions and did not improve per patient.  This August got 1st dose Stelara + 1st SubQ injection is due now.Pt states her colitis is now better with no diarrhea, bloody stool, abd pain or fever. 1 BM/d CT last year showed findings of cirrhosis. Had Deep Brain Stimulator inserted this year for Parkinsons. EGD was scheduled for varices screening but canceled due to brain stimulator wire issue. Has variable pain in RUQ which is better now.  7-6-12 Pt moved and developed PNA. Pt reports that her UC is great.  Pt reports that she is still on Stelara. Pt reprots that she is having pain in the lower back and trouble standing, patient is having a repositioning of stimulator battery. Pt reports that she is not sure if the stelara is contributing to her cognitive decline. her uc is controlled. no bleeding. no abd pain. prev ammonia was normal in october. Pt with worsening slurring of speech. Has seen neurology and thought d/t parkinsons.

## 2021-07-09 LAB
AMMONIA, PLASMA: 104
FOLATE (FOLIC ACID), SERUM: 15.5
VITAMIN B12: 510
ZINC, PLASMA OR SERUM: 71

## 2021-07-12 ENCOUNTER — WEB ENCOUNTER (OUTPATIENT)
Dept: URBAN - METROPOLITAN AREA CLINIC 54 | Facility: CLINIC | Age: 66
End: 2021-07-12

## 2022-01-04 ENCOUNTER — OFFICE VISIT (OUTPATIENT)
Dept: URBAN - NONMETROPOLITAN AREA CLINIC 4 | Facility: CLINIC | Age: 67
End: 2022-01-04

## 2023-08-03 ENCOUNTER — LAB OUTSIDE AN ENCOUNTER (OUTPATIENT)
Dept: URBAN - METROPOLITAN AREA CLINIC 19 | Facility: CLINIC | Age: 68
End: 2023-08-03

## 2023-08-03 ENCOUNTER — OFFICE VISIT (OUTPATIENT)
Dept: URBAN - METROPOLITAN AREA CLINIC 19 | Facility: CLINIC | Age: 68
End: 2023-08-03
Payer: MEDICARE

## 2023-08-03 VITALS
HEIGHT: 62 IN | WEIGHT: 178 LBS | BODY MASS INDEX: 32.76 KG/M2 | SYSTOLIC BLOOD PRESSURE: 102 MMHG | DIASTOLIC BLOOD PRESSURE: 68 MMHG | TEMPERATURE: 98.1 F | HEART RATE: 69 BPM | OXYGEN SATURATION: 98 %

## 2023-08-03 DIAGNOSIS — K59.09 CHRONIC CONSTIPATION: ICD-10-CM

## 2023-08-03 DIAGNOSIS — K51.90 ULCERATIVE COLITIS: ICD-10-CM

## 2023-08-03 DIAGNOSIS — K75.81 NASH WITH CIRRHOSIS: ICD-10-CM

## 2023-08-03 PROCEDURE — 99215 OFFICE O/P EST HI 40 MIN: CPT | Performed by: NURSE PRACTITIONER

## 2023-08-03 RX ORDER — LEVOCETIRIZINE DIHYDROCHLORIDE 5 MG/1
1 TABLET IN THE EVENING TABLET ORAL ONCE A DAY
Status: ACTIVE | COMMUNITY

## 2023-08-03 RX ORDER — VENLAFAXINE HYDROCHLORIDE 75 MG/1
TAKE 1 CAPSULE (75 MG) BY ORAL ROUTE ONCE DAILY WITH FOOD CAPSULE, EXTENDED RELEASE ORAL 1
Qty: 0 | Refills: 0 | Status: ACTIVE | COMMUNITY

## 2023-08-03 RX ORDER — USTEKINUMAB 45 MG/.5ML
AS DIRECTED INJECTION, SOLUTION SUBCUTANEOUS
Status: ACTIVE | COMMUNITY

## 2023-08-03 RX ORDER — ASPIRIN 81 MG/1
1 TABLET TABLET, COATED ORAL
Status: ACTIVE | COMMUNITY

## 2023-08-03 RX ORDER — LEVOTHYROXINE SODIUM 0.11 MG/1
1 TABLET IN THE MORNING ON AN EMPTY STOMACH TABLET ORAL ONCE A DAY
Status: ACTIVE | COMMUNITY

## 2023-08-03 RX ORDER — CICLESONIDE 160 UG/1
1 PUFF AEROSOL, METERED RESPIRATORY (INHALATION) TWICE A DAY
Status: ACTIVE | COMMUNITY

## 2023-08-03 RX ORDER — ROSUVASTATIN CALCIUM 20 MG
TAKE 1 TABLET (20 MG) BY ORAL ROUTE ONCE DAILY TABLET ORAL 1
Qty: 0 | Refills: 0 | Status: ACTIVE | COMMUNITY

## 2023-08-03 RX ORDER — METOPROLOL SUCCINATE 25 MG/1
TAKE 1 TABLET (25 MG) BY ORAL ROUTE ONCE DAILY TABLET, EXTENDED RELEASE ORAL 1
Qty: 0 | Refills: 0 | Status: ACTIVE | COMMUNITY

## 2023-08-03 RX ORDER — CLONAZEPAM 0.5 MG/1
1 TABLET AT BEDTIME TABLET ORAL ONCE A DAY
Status: ACTIVE | COMMUNITY

## 2023-08-03 RX ORDER — ALBUTEROL SULFATE 90 UG/1
1 PUFF AS NEEDED AEROSOL, METERED RESPIRATORY (INHALATION)
Status: ACTIVE | COMMUNITY

## 2023-08-03 NOTE — PHYSICAL EXAM CONSTITUTIONAL:
well developed, well nourished , in no acute distress , very slow soft voice communication, slow movements, ambulates with rolling walker

## 2023-08-03 NOTE — HPI-TODAY'S VISIT:
Ms. Puga is a 69 yo female with PMH of breast cancer in 2012 s/p lumpectomy/radiation, UC diagnosed 9/2019 (currently not on treatment), Parkinson's, HTN/HLD, Hashimoto's thyroiditis, RAI cirrhosis who presents today for c/o constipation. Last seen in GI clinic in the [place] office by Dr. Mellisa Stinson on 7/6/2021.   She reports onset of constipation since she started new medication for Parkinson's called amantadine . Takes colace or smooth move tea. Has a BM only once a week. Has to strain a lot. No bloody or black stools. No abdominal pain.   She reports she stopped Stelara in 6/2022 when she had direct brain stimulation for Parkinson's surgery and UC has remained well-controlled since.  She is not on any any blood thinners besides one baby aspirin/day. Follows with Dr. Resendez.     Prior Hx:     10/2019-Colonoscopy Dr Whitney-Ulcerative colitis, patchy.        12/2019-Hospitalized. Entyvio started. She had 4 infusions and did not improve per patient. This August got 1st dose Stelara + 1st SubQ injection is due now.Pt states her colitis is now better with no diarrhea, bloody stool, abd pain or fever. 1 BM/d        CT last year showed findings of cirrhosis.        Had Deep Brain Stimulator inserted this year for Parkinsons.        EGD was scheduled for varices screening but canceled due to brain stimulator wire issue.        Has variable pain in RUQ which is better now.        7-6-12        Pt moved and developed PNA. Pt reports that her UC is great. Pt reports that she is still on Stelara. Pt reprots that she is having pain in the lower back and trouble standing, patient is having a repositioning of stimulator battery. Pt reports that she is not sure if the stelara is contributing to her cognitive decline. her uc is controlled. no bleeding. no abd pain. prev ammonia was normal in october. Pt with worsening slurring of speech. Has seen neurology and thought d/t parkinsons..

## 2023-08-04 LAB
A/G RATIO: 1.6
AFP, SERUM, TUMOR MARKER: 5.5
ALBUMIN: 4.5
ALKALINE PHOSPHATASE: 98
ALT (SGPT): 8
AST (SGOT): 11
BILIRUBIN, TOTAL: 0.8
BUN/CREATININE RATIO: 21
BUN: 11
CALCIUM: 9.3
CARBON DIOXIDE, TOTAL: 23
CHLORIDE: 104
CREATININE: 0.53
EGFR: 102
GLOBULIN, TOTAL: 2.8
GLUCOSE: 83
HEMATOCRIT: 45.2
HEMOGLOBIN: 14.7
INR: 1.2
Lab: (no result)
MCH: 29.6
MCHC: 32.5
MCV: 91
NRBC: (no result)
PLATELETS: 149
POTASSIUM: 4.3
PROTEIN, TOTAL: 7.3
PROTHROMBIN TIME: 11.9
RBC: 4.97
RDW: 13.1
SODIUM: 142
WBC: 5.4

## 2023-08-09 ENCOUNTER — OFFICE VISIT (OUTPATIENT)
Dept: URBAN - METROPOLITAN AREA CLINIC 18 | Facility: CLINIC | Age: 68
End: 2023-08-09
Payer: MEDICARE

## 2023-08-09 DIAGNOSIS — K75.81 NASH WITH CIRRHOSIS: ICD-10-CM

## 2023-08-09 PROCEDURE — 76705 ECHO EXAM OF ABDOMEN: CPT | Performed by: INTERNAL MEDICINE

## 2023-08-17 ENCOUNTER — LAB OUTSIDE AN ENCOUNTER (OUTPATIENT)
Dept: URBAN - METROPOLITAN AREA CLINIC 19 | Facility: CLINIC | Age: 68
End: 2023-08-17

## 2023-08-17 ENCOUNTER — TELEPHONE ENCOUNTER (OUTPATIENT)
Dept: URBAN - METROPOLITAN AREA CLINIC 19 | Facility: CLINIC | Age: 68
End: 2023-08-17

## 2023-08-28 ENCOUNTER — LAB OUTSIDE AN ENCOUNTER (OUTPATIENT)
Dept: URBAN - METROPOLITAN AREA CLINIC 80 | Facility: CLINIC | Age: 68
End: 2023-08-28

## 2023-08-28 LAB
CREATININE POC: 0.6
PERFORMING LAB: (no result)

## 2023-08-30 ENCOUNTER — TELEPHONE ENCOUNTER (OUTPATIENT)
Dept: URBAN - METROPOLITAN AREA CLINIC 19 | Facility: CLINIC | Age: 68
End: 2023-08-30

## 2023-08-31 ENCOUNTER — TELEPHONE ENCOUNTER (OUTPATIENT)
Dept: URBAN - METROPOLITAN AREA CLINIC 19 | Facility: CLINIC | Age: 68
End: 2023-08-31

## 2023-09-06 ENCOUNTER — TELEPHONE ENCOUNTER (OUTPATIENT)
Dept: URBAN - METROPOLITAN AREA CLINIC 19 | Facility: CLINIC | Age: 68
End: 2023-09-06

## 2023-09-07 ENCOUNTER — TELEPHONE ENCOUNTER (OUTPATIENT)
Dept: URBAN - METROPOLITAN AREA CLINIC 19 | Facility: CLINIC | Age: 68
End: 2023-09-07

## 2023-10-23 ENCOUNTER — TELEPHONE ENCOUNTER (OUTPATIENT)
Dept: URBAN - METROPOLITAN AREA CLINIC 19 | Facility: CLINIC | Age: 68
End: 2023-10-23

## 2023-10-23 ENCOUNTER — OFFICE VISIT (OUTPATIENT)
Dept: URBAN - METROPOLITAN AREA MEDICAL CENTER 25 | Facility: MEDICAL CENTER | Age: 68
End: 2023-10-23

## 2023-10-23 ENCOUNTER — LAB OUTSIDE AN ENCOUNTER (OUTPATIENT)
Dept: URBAN - METROPOLITAN AREA CLINIC 19 | Facility: CLINIC | Age: 68
End: 2023-10-23

## 2023-10-23 ENCOUNTER — CLAIMS CREATED FROM THE CLAIM WINDOW (OUTPATIENT)
Dept: URBAN - METROPOLITAN AREA MEDICAL CENTER 25 | Facility: MEDICAL CENTER | Age: 68
End: 2023-10-23
Payer: MEDICARE

## 2023-10-23 DIAGNOSIS — K51.80 CHRONIC PANCOLONIC ULCERATIVE COLITIS: ICD-10-CM

## 2023-10-23 DIAGNOSIS — K52.89 (LYMPHOCYTIC) MICROSCOPIC COLITIS: ICD-10-CM

## 2023-10-23 PROCEDURE — 45380 COLONOSCOPY AND BIOPSY: CPT | Performed by: INTERNAL MEDICINE

## 2023-10-23 RX ORDER — LEVOCETIRIZINE DIHYDROCHLORIDE 5 MG/1
1 TABLET IN THE EVENING TABLET ORAL ONCE A DAY
Status: ACTIVE | COMMUNITY

## 2023-10-23 RX ORDER — VENLAFAXINE HYDROCHLORIDE 75 MG/1
TAKE 1 CAPSULE (75 MG) BY ORAL ROUTE ONCE DAILY WITH FOOD CAPSULE, EXTENDED RELEASE ORAL 1
Qty: 0 | Refills: 0 | Status: ACTIVE | COMMUNITY

## 2023-10-23 RX ORDER — CICLESONIDE 160 UG/1
1 PUFF AEROSOL, METERED RESPIRATORY (INHALATION) TWICE A DAY
Status: ACTIVE | COMMUNITY

## 2023-10-23 RX ORDER — ASPIRIN 81 MG/1
1 TABLET TABLET, COATED ORAL
Status: ACTIVE | COMMUNITY

## 2023-10-23 RX ORDER — LEVOTHYROXINE SODIUM 0.11 MG/1
1 TABLET IN THE MORNING ON AN EMPTY STOMACH TABLET ORAL ONCE A DAY
Status: ACTIVE | COMMUNITY

## 2023-10-23 RX ORDER — ROSUVASTATIN CALCIUM 20 MG
TAKE 1 TABLET (20 MG) BY ORAL ROUTE ONCE DAILY TABLET ORAL 1
Qty: 0 | Refills: 0 | Status: ACTIVE | COMMUNITY

## 2023-10-23 RX ORDER — CLONAZEPAM 0.5 MG/1
1 TABLET AT BEDTIME TABLET ORAL ONCE A DAY
Status: ACTIVE | COMMUNITY

## 2023-10-23 RX ORDER — USTEKINUMAB 45 MG/.5ML
AS DIRECTED INJECTION, SOLUTION SUBCUTANEOUS
Status: ACTIVE | COMMUNITY

## 2023-10-23 RX ORDER — METOPROLOL SUCCINATE 25 MG/1
TAKE 1 TABLET (25 MG) BY ORAL ROUTE ONCE DAILY TABLET, EXTENDED RELEASE ORAL 1
Qty: 0 | Refills: 0 | Status: ACTIVE | COMMUNITY

## 2023-10-23 RX ORDER — ALBUTEROL SULFATE 90 UG/1
1 PUFF AS NEEDED AEROSOL, METERED RESPIRATORY (INHALATION)
Status: ACTIVE | COMMUNITY

## 2023-10-26 ENCOUNTER — TELEPHONE ENCOUNTER (OUTPATIENT)
Dept: URBAN - METROPOLITAN AREA CLINIC 19 | Facility: CLINIC | Age: 68
End: 2023-10-26

## 2023-11-20 ENCOUNTER — OFFICE VISIT (OUTPATIENT)
Dept: URBAN - METROPOLITAN AREA CLINIC 19 | Facility: CLINIC | Age: 68
End: 2023-11-20
Payer: MEDICARE

## 2023-11-20 ENCOUNTER — LAB OUTSIDE AN ENCOUNTER (OUTPATIENT)
Dept: URBAN - METROPOLITAN AREA CLINIC 19 | Facility: CLINIC | Age: 68
End: 2023-11-20

## 2023-11-20 VITALS
WEIGHT: 175.2 LBS | HEIGHT: 62 IN | SYSTOLIC BLOOD PRESSURE: 130 MMHG | TEMPERATURE: 96.9 F | BODY MASS INDEX: 32.24 KG/M2 | DIASTOLIC BLOOD PRESSURE: 80 MMHG

## 2023-11-20 DIAGNOSIS — K51.819 OTHER ULCERATIVE COLITIS WITH COMPLICATION: ICD-10-CM

## 2023-11-20 DIAGNOSIS — K75.81 NASH WITH CIRRHOSIS: ICD-10-CM

## 2023-11-20 PROCEDURE — 99214 OFFICE O/P EST MOD 30 MIN: CPT | Performed by: NURSE PRACTITIONER

## 2023-11-20 RX ORDER — VENLAFAXINE HYDROCHLORIDE 75 MG/1
TAKE 1 CAPSULE (75 MG) BY ORAL ROUTE ONCE DAILY WITH FOOD CAPSULE, EXTENDED RELEASE ORAL 1
Qty: 0 | Refills: 0 | Status: ACTIVE | COMMUNITY

## 2023-11-20 RX ORDER — METOPROLOL SUCCINATE 25 MG/1
TAKE 1 TABLET (25 MG) BY ORAL ROUTE ONCE DAILY TABLET, EXTENDED RELEASE ORAL 1
Qty: 0 | Refills: 0 | Status: ACTIVE | COMMUNITY

## 2023-11-20 RX ORDER — ALBUTEROL SULFATE 90 UG/1
1 PUFF AS NEEDED AEROSOL, METERED RESPIRATORY (INHALATION)
Status: ACTIVE | COMMUNITY

## 2023-11-20 RX ORDER — CLONAZEPAM 0.5 MG/1
1 TABLET AT BEDTIME TABLET ORAL ONCE A DAY
Status: ACTIVE | COMMUNITY

## 2023-11-20 RX ORDER — ASPIRIN 81 MG/1
1 TABLET TABLET, COATED ORAL
Status: ACTIVE | COMMUNITY

## 2023-11-20 RX ORDER — LEVOTHYROXINE SODIUM 0.11 MG/1
1 TABLET IN THE MORNING ON AN EMPTY STOMACH TABLET ORAL ONCE A DAY
Status: ACTIVE | COMMUNITY

## 2023-11-20 RX ORDER — USTEKINUMAB 45 MG/.5ML
AS DIRECTED INJECTION, SOLUTION SUBCUTANEOUS
Status: ACTIVE | COMMUNITY

## 2023-11-20 RX ORDER — CICLESONIDE 160 UG/1
1 PUFF AEROSOL, METERED RESPIRATORY (INHALATION) TWICE A DAY
Status: ACTIVE | COMMUNITY

## 2023-11-20 RX ORDER — LEVOCETIRIZINE DIHYDROCHLORIDE 5 MG/1
1 TABLET IN THE EVENING TABLET ORAL ONCE A DAY
Status: ACTIVE | COMMUNITY

## 2023-11-20 RX ORDER — ROSUVASTATIN CALCIUM 20 MG
TAKE 1 TABLET (20 MG) BY ORAL ROUTE ONCE DAILY TABLET ORAL 1
Qty: 0 | Refills: 0 | Status: ACTIVE | COMMUNITY

## 2023-11-20 NOTE — HPI-TODAY'S VISIT:
Ms. Puga is a 67 yo female with PMH of breast cancer in 2012 s/p lumpectomy/radiation, UC diagnosed 9/2019 (currently not on treatment), Parkinson's, HTN/HLD, Hashimoto's thyroiditis, RAI cirrhosis who presents today for follow-up. Last seen in GI clinic 8/3/2023 for c/o constipation, f/u cirrhosis, and UC. Labs from that day CBC, CMP unremarkable, aFP negative, INR 1.2 NaMELD score = 8 She was instructed to trial miralax daily and titrate. Colonoscopy was done by Dr. Dunn on 10/23/2023 - multiple diverticulosis in the entire colon.  Path from random biopsies-multiple fragments of colorectal mucosa with chronic predominantly inactive inflammation, focal foci of acute inflammation with minimal architectural distortion.  No evidence of ulceration or granuloma formation.  2-year follow-up given.   She continues to have only one BM/week. Never did try miralax as previously discussed. Constipation onset since she startinbg new medication for Parkinson's called amantadine .       Prior history summarized below: -Seen in GI clinic in the [place] office by Dr. Mellisa Stinson on 7/6/2021. -She reports she stopped Stelara in 6/2020 when she had direct brain stimulation for Parkinson's surgery and UC has remained well-controlled since. -She is not on any any blood thinners besides one baby aspirin/day.             10/2019-Colonoscopy Dr Whitney-Ulcerative colitis, patchy.         12/2019-Hospitalized. Entyvio started. She had 4 infusions and did not improve per patient. This August got 1st dose Stelara with reported improvement of colitis.         CT last year showed findings of cirrhosis.         Had Deep Brain Stimulator inserted 2020 for Parkinsons.         EGD was scheduled for varices screening but canceled due to brain stimulator wire issue.

## 2023-11-20 NOTE — PHYSICAL EXAM CONSTITUTIONAL:
well developed, well nourished , in no acute distress , ambulating with a cane, normal communication ability

## 2023-11-21 LAB
ALBUMIN/GLOBULIN RATIO: 1.4
ALBUMIN: 4.2
ALKALINE PHOSPHATASE: 85
ALT: 9
AST: 16
BILIRUBIN, TOTAL: 0.8
BUN/CREATININE RATIO: (no result)
CALCIUM: 8.9
CARBON DIOXIDE: 24
CHLORIDE: 106
CREATININE: 0.62
EGFR: 98
FIB 4 INDEX: 2.21
FIB 4 INTERPRETATION: (no result)
GLOBULIN: 3.1
GLUCOSE: 125
HEMATOCRIT: 41.6
HEMOGLOBIN: 14
INR: 1.1
MCH: 30.2
MCHC: 33.7
MCV: 89.7
MPV: 11.2
PLATELET COUNT: 159
PLATELET COUNT: 159
POTASSIUM: 4.1
PROTEIN, TOTAL: 7.3
PT: 11.2
RDW: 12.6
RED BLOOD CELL COUNT: 4.64
SODIUM: 140
UREA NITROGEN (BUN): 13
WHITE BLOOD CELL COUNT: 6

## 2024-01-18 ENCOUNTER — OFFICE VISIT (OUTPATIENT)
Dept: URBAN - METROPOLITAN AREA MEDICAL CENTER 25 | Facility: MEDICAL CENTER | Age: 69
End: 2024-01-18
Payer: MEDICARE

## 2024-01-18 DIAGNOSIS — K74.69 CIRRHOSIS, CRYPTOGENIC: ICD-10-CM

## 2024-01-18 DIAGNOSIS — K31.89 ACHYLIA: ICD-10-CM

## 2024-01-18 PROCEDURE — 43239 EGD BIOPSY SINGLE/MULTIPLE: CPT | Performed by: INTERNAL MEDICINE

## 2024-01-18 RX ORDER — USTEKINUMAB 45 MG/.5ML
AS DIRECTED INJECTION, SOLUTION SUBCUTANEOUS
Status: ACTIVE | COMMUNITY

## 2024-01-18 RX ORDER — LEVOCETIRIZINE DIHYDROCHLORIDE 5 MG/1
1 TABLET IN THE EVENING TABLET ORAL ONCE A DAY
Status: ACTIVE | COMMUNITY

## 2024-01-18 RX ORDER — ALBUTEROL SULFATE 90 UG/1
1 PUFF AS NEEDED AEROSOL, METERED RESPIRATORY (INHALATION)
Status: ACTIVE | COMMUNITY

## 2024-01-18 RX ORDER — ROSUVASTATIN CALCIUM 20 MG
TAKE 1 TABLET (20 MG) BY ORAL ROUTE ONCE DAILY TABLET ORAL 1
Qty: 0 | Refills: 0 | Status: ACTIVE | COMMUNITY

## 2024-01-18 RX ORDER — ASPIRIN 81 MG/1
1 TABLET TABLET, COATED ORAL
Status: ACTIVE | COMMUNITY

## 2024-01-18 RX ORDER — CICLESONIDE 160 UG/1
1 PUFF AEROSOL, METERED RESPIRATORY (INHALATION) TWICE A DAY
Status: ACTIVE | COMMUNITY

## 2024-01-18 RX ORDER — METOPROLOL SUCCINATE 25 MG/1
TAKE 1 TABLET (25 MG) BY ORAL ROUTE ONCE DAILY TABLET, EXTENDED RELEASE ORAL 1
Qty: 0 | Refills: 0 | Status: ACTIVE | COMMUNITY

## 2024-01-18 RX ORDER — LEVOTHYROXINE SODIUM 0.11 MG/1
1 TABLET IN THE MORNING ON AN EMPTY STOMACH TABLET ORAL ONCE A DAY
Status: ACTIVE | COMMUNITY

## 2024-01-18 RX ORDER — VENLAFAXINE HYDROCHLORIDE 75 MG/1
TAKE 1 CAPSULE (75 MG) BY ORAL ROUTE ONCE DAILY WITH FOOD CAPSULE, EXTENDED RELEASE ORAL 1
Qty: 0 | Refills: 0 | Status: ACTIVE | COMMUNITY

## 2024-01-18 RX ORDER — CLONAZEPAM 0.5 MG/1
1 TABLET AT BEDTIME TABLET ORAL ONCE A DAY
Status: ACTIVE | COMMUNITY

## 2024-01-22 ENCOUNTER — TELEPHONE ENCOUNTER (OUTPATIENT)
Dept: URBAN - METROPOLITAN AREA CLINIC 19 | Facility: CLINIC | Age: 69
End: 2024-01-22

## 2024-01-23 ENCOUNTER — TELEPHONE ENCOUNTER (OUTPATIENT)
Dept: URBAN - METROPOLITAN AREA CLINIC 19 | Facility: CLINIC | Age: 69
End: 2024-01-23

## 2024-01-29 ENCOUNTER — LAB OUTSIDE AN ENCOUNTER (OUTPATIENT)
Dept: URBAN - METROPOLITAN AREA CLINIC 19 | Facility: CLINIC | Age: 69
End: 2024-01-29

## 2024-01-31 LAB
ADENOVIRUS F 40/41: NOT DETECTED
CAMPYLOBACTER: NOT DETECTED
CLOSTRIDIUM DIFFICILE: NOT DETECTED
ENTAMOEBA HISTOLYTICA: NOT DETECTED
ENTEROAGGREGATIVE E.COLI: NOT DETECTED
ENTEROTOXIGENIC E.COLI: NOT DETECTED
ESCHERICHIA COLI O157: NOT DETECTED
GIARDIA LAMBLIA: NOT DETECTED
NOROVIRUS GI/GII: NOT DETECTED
ROTAVIRUS A: NOT DETECTED
SALMONELLA SPP.: NOT DETECTED
SHIGA-LIKE TOXIN PRODUCING E.COLI: NOT DETECTED
SHIGELLA SPP. / ENTEROINVASIVE E.COLI: NOT DETECTED
VIBRIO PARAHAEMOLYTICUS: NOT DETECTED
VIBRIO SPP.: NOT DETECTED
YERSINIA ENTEROCOLITICA: NOT DETECTED

## 2024-02-27 ENCOUNTER — OV EP (OUTPATIENT)
Dept: URBAN - METROPOLITAN AREA CLINIC 19 | Facility: CLINIC | Age: 69
End: 2024-02-27

## 2024-02-27 RX ORDER — LEVOTHYROXINE SODIUM 0.11 MG/1
1 TABLET IN THE MORNING ON AN EMPTY STOMACH TABLET ORAL ONCE A DAY
Status: ACTIVE | COMMUNITY

## 2024-02-27 RX ORDER — CICLESONIDE 160 UG/1
1 PUFF AEROSOL, METERED RESPIRATORY (INHALATION) TWICE A DAY
Status: ACTIVE | COMMUNITY

## 2024-02-27 RX ORDER — ROSUVASTATIN CALCIUM 20 MG
TAKE 1 TABLET (20 MG) BY ORAL ROUTE ONCE DAILY TABLET ORAL 1
Qty: 0 | Refills: 0 | Status: ACTIVE | COMMUNITY

## 2024-02-27 RX ORDER — VENLAFAXINE HYDROCHLORIDE 75 MG/1
TAKE 1 CAPSULE (75 MG) BY ORAL ROUTE ONCE DAILY WITH FOOD CAPSULE, EXTENDED RELEASE ORAL 1
Qty: 0 | Refills: 0 | Status: ACTIVE | COMMUNITY

## 2024-02-27 RX ORDER — USTEKINUMAB 45 MG/.5ML
AS DIRECTED INJECTION, SOLUTION SUBCUTANEOUS
Status: ACTIVE | COMMUNITY

## 2024-02-27 RX ORDER — LEVOCETIRIZINE DIHYDROCHLORIDE 5 MG/1
1 TABLET IN THE EVENING TABLET ORAL ONCE A DAY
Status: ACTIVE | COMMUNITY

## 2024-02-27 RX ORDER — ASPIRIN 81 MG/1
1 TABLET TABLET, COATED ORAL
Status: ACTIVE | COMMUNITY

## 2024-02-27 RX ORDER — METOPROLOL SUCCINATE 25 MG/1
TAKE 1 TABLET (25 MG) BY ORAL ROUTE ONCE DAILY TABLET, EXTENDED RELEASE ORAL 1
Qty: 0 | Refills: 0 | Status: ACTIVE | COMMUNITY

## 2024-02-27 RX ORDER — ALBUTEROL SULFATE 90 UG/1
1 PUFF AS NEEDED AEROSOL, METERED RESPIRATORY (INHALATION)
Status: ACTIVE | COMMUNITY

## 2024-02-27 RX ORDER — CLONAZEPAM 0.5 MG/1
1 TABLET AT BEDTIME TABLET ORAL ONCE A DAY
Status: ACTIVE | COMMUNITY

## 2024-04-03 ENCOUNTER — OV EP (OUTPATIENT)
Dept: URBAN - METROPOLITAN AREA CLINIC 19 | Facility: CLINIC | Age: 69
End: 2024-04-03
Payer: MEDICARE

## 2024-04-03 VITALS
DIASTOLIC BLOOD PRESSURE: 80 MMHG | OXYGEN SATURATION: 95 % | HEART RATE: 63 BPM | SYSTOLIC BLOOD PRESSURE: 120 MMHG | HEIGHT: 62 IN | BODY MASS INDEX: 31.28 KG/M2 | TEMPERATURE: 97.3 F | WEIGHT: 170 LBS

## 2024-04-03 DIAGNOSIS — K75.81 NASH WITH CIRRHOSIS: ICD-10-CM

## 2024-04-03 DIAGNOSIS — K51.80 CHRONIC PANCOLONIC ULCERATIVE COLITIS: ICD-10-CM

## 2024-04-03 PROCEDURE — 99212 OFFICE O/P EST SF 10 MIN: CPT | Performed by: NURSE PRACTITIONER

## 2024-04-03 RX ORDER — LEVOCETIRIZINE DIHYDROCHLORIDE 5 MG/1
1 TABLET IN THE EVENING TABLET ORAL ONCE A DAY
Status: ACTIVE | COMMUNITY

## 2024-04-03 RX ORDER — ASPIRIN 81 MG/1
1 TABLET TABLET, COATED ORAL
Status: ACTIVE | COMMUNITY

## 2024-04-03 RX ORDER — LEVOTHYROXINE SODIUM 0.11 MG/1
1 TABLET IN THE MORNING ON AN EMPTY STOMACH TABLET ORAL ONCE A DAY
Status: ACTIVE | COMMUNITY

## 2024-04-03 RX ORDER — ROSUVASTATIN CALCIUM 20 MG
TAKE 1 TABLET (20 MG) BY ORAL ROUTE ONCE DAILY TABLET ORAL 1
Qty: 0 | Refills: 0 | Status: ACTIVE | COMMUNITY

## 2024-04-03 RX ORDER — CLONAZEPAM 0.5 MG/1
1 TABLET AT BEDTIME TABLET ORAL ONCE A DAY
Status: ACTIVE | COMMUNITY

## 2024-04-03 RX ORDER — METOPROLOL SUCCINATE 25 MG/1
TAKE 1 TABLET (25 MG) BY ORAL ROUTE ONCE DAILY TABLET, EXTENDED RELEASE ORAL 1
Qty: 0 | Refills: 0 | Status: ACTIVE | COMMUNITY

## 2024-04-03 RX ORDER — CICLESONIDE 160 UG/1
1 PUFF AEROSOL, METERED RESPIRATORY (INHALATION) TWICE A DAY
Status: ACTIVE | COMMUNITY

## 2024-04-03 RX ORDER — VENLAFAXINE HYDROCHLORIDE 75 MG/1
TAKE 1 CAPSULE (75 MG) BY ORAL ROUTE ONCE DAILY WITH FOOD CAPSULE, EXTENDED RELEASE ORAL 1
Qty: 0 | Refills: 0 | Status: ACTIVE | COMMUNITY

## 2024-04-03 RX ORDER — ALBUTEROL SULFATE 90 UG/1
1 PUFF AS NEEDED AEROSOL, METERED RESPIRATORY (INHALATION)
Status: ACTIVE | COMMUNITY

## 2024-04-03 NOTE — PHYSICAL EXAM CONSTITUTIONAL:
well developed, well nourished , in no acute distress , normal communication ability, wheelchair bound

## 2024-06-13 ENCOUNTER — DASHBOARD ENCOUNTERS (OUTPATIENT)
Age: 69
End: 2024-06-13

## 2024-06-13 ENCOUNTER — OFFICE VISIT (OUTPATIENT)
Dept: URBAN - METROPOLITAN AREA CLINIC 19 | Facility: CLINIC | Age: 69
End: 2024-06-13
Payer: MEDICARE

## 2024-06-13 VITALS
TEMPERATURE: 98.6 F | DIASTOLIC BLOOD PRESSURE: 80 MMHG | BODY MASS INDEX: 32.2 KG/M2 | SYSTOLIC BLOOD PRESSURE: 120 MMHG | HEIGHT: 62 IN | HEART RATE: 87 BPM | WEIGHT: 175 LBS

## 2024-06-13 DIAGNOSIS — K51.80 CHRONIC PANCOLONIC ULCERATIVE COLITIS: ICD-10-CM

## 2024-06-13 DIAGNOSIS — K75.81 NASH WITH CIRRHOSIS: ICD-10-CM

## 2024-06-13 PROCEDURE — 99214 OFFICE O/P EST MOD 30 MIN: CPT | Performed by: NURSE PRACTITIONER

## 2024-06-13 RX ORDER — ROSUVASTATIN CALCIUM 20 MG
TAKE 1 TABLET (20 MG) BY ORAL ROUTE ONCE DAILY TABLET ORAL 1
Qty: 0 | Refills: 0 | Status: ACTIVE | COMMUNITY

## 2024-06-13 RX ORDER — ASPIRIN 81 MG/1
1 TABLET TABLET, COATED ORAL
Status: ACTIVE | COMMUNITY

## 2024-06-13 RX ORDER — METOPROLOL SUCCINATE 25 MG/1
TAKE 1 TABLET (25 MG) BY ORAL ROUTE ONCE DAILY TABLET, EXTENDED RELEASE ORAL 1
Qty: 0 | Refills: 0 | Status: ACTIVE | COMMUNITY

## 2024-06-13 RX ORDER — LEVOCETIRIZINE DIHYDROCHLORIDE 5 MG/1
1 TABLET IN THE EVENING TABLET ORAL ONCE A DAY
Status: ACTIVE | COMMUNITY

## 2024-06-13 RX ORDER — CLONAZEPAM 0.5 MG/1
1 TABLET AT BEDTIME TABLET ORAL ONCE A DAY
Status: ACTIVE | COMMUNITY

## 2024-06-13 RX ORDER — VENLAFAXINE HYDROCHLORIDE 75 MG/1
TAKE 1 CAPSULE (75 MG) BY ORAL ROUTE ONCE DAILY WITH FOOD CAPSULE, EXTENDED RELEASE ORAL 1
Qty: 0 | Refills: 0 | Status: ACTIVE | COMMUNITY

## 2024-06-13 RX ORDER — CICLESONIDE 160 UG/1
1 PUFF AEROSOL, METERED RESPIRATORY (INHALATION) TWICE A DAY
Status: ACTIVE | COMMUNITY

## 2024-06-13 RX ORDER — ALBUTEROL SULFATE 90 UG/1
1 PUFF AS NEEDED AEROSOL, METERED RESPIRATORY (INHALATION)
Status: ACTIVE | COMMUNITY

## 2024-06-13 RX ORDER — LEVOTHYROXINE SODIUM 0.11 MG/1
1 TABLET IN THE MORNING ON AN EMPTY STOMACH TABLET ORAL ONCE A DAY
Status: ACTIVE | COMMUNITY

## 2024-06-13 NOTE — PHYSICAL EXAM GASTROINTESTINAL
Abdomen , soft, nontender, nondistended , no guarding or rigidity , no masses palpable , normal bowel sounds , Liver and Spleen ,no hepatosplenomegaly

## 2024-06-13 NOTE — HPI-TODAY'S VISIT:
Ms. Puga is a 70 yo female with PMH of breast cancer in 2012 s/p lumpectomy/radiation, UC diagnosed 9/2019 (currently not on treatment), Parkinson's, HTN/HLD, Hashimoto's thyroiditis, RAI cirrhosis, fibromuscular dysplasia of internal carotid arteries, saccular and renal artery aneurysm who presents today for follow-up. Last seen in GI clinic 4/3/2024.  She is due for repeat colonoscopy this year.  She reports she was doing fine for awhile, was doing great last vist. Now with change in bowel habit which 3 weeks ago with constipation - reports not having a BM for 10 days and when she did came out mushy like #6 on bristol scale. Denies blood in stool. Only has abdominal pain when she has to have a BM which resolves after BM. Also reports having a lot of gas. She is mostly wheelchair bound. She reports she is going to be part of a clinical trial at [place] using gamma rays for her Parkinson's. States she saw her cardiologist recently and had a good w/u.     Prior history summarized below: -Seen in GI clinic in the [place] office by Dr. Mellisa Stinson on 7/6/2021. -She reports she stopped Stelara in 6/2020 when she had direct brain stimulation for Parkinson's surgery and UC has remained well-controlled since. -She is not on any any blood thinners besides one baby aspirin/day.  10/2019-Colonoscopy Dr Whitney-Ulcerative colitis, patchy. 12/2019-Hospitalized. Entyvio started. She had 4 infusions and did not improve per patient. Then switched to Stelara with reported improvement of colitis. CT last year showed findings of cirrhosis. Had Deep Brain Stimulator inserted 2020 for Parkinsons.  Colonoscopy was done by Dr. Dunn on 10/23/2023 - multiple diverticulosis in the entire colon. Path from random biopsies-multiple fragments of colorectal mucosa with chronic predominantly inactive inflammation, focal foci of acute inflammation with minimal architectural distortion. No evidence of ulceration or granuloma formation. 2-year follow-up given.   1/18/2024 EGD showed normal esophagus, no esophageal varices, gastritis, normal duodenum. Path: Stomach antrum-minimal reactive/reparative changes negative for H. pylori dysplasia or malignancy.  1/29/2024 stool studies checked for c/o diarrhea were negative.

## 2024-06-20 ENCOUNTER — TELEPHONE ENCOUNTER (OUTPATIENT)
Dept: URBAN - METROPOLITAN AREA CLINIC 19 | Facility: CLINIC | Age: 69
End: 2024-06-20

## 2024-06-24 ENCOUNTER — OFFICE VISIT (OUTPATIENT)
Dept: URBAN - METROPOLITAN AREA CLINIC 19 | Facility: CLINIC | Age: 69
End: 2024-06-24

## 2024-07-03 ENCOUNTER — TELEPHONE ENCOUNTER (OUTPATIENT)
Dept: URBAN - METROPOLITAN AREA CLINIC 19 | Facility: CLINIC | Age: 69
End: 2024-07-03

## 2024-09-04 ENCOUNTER — OFFICE VISIT (OUTPATIENT)
Dept: URBAN - METROPOLITAN AREA CLINIC 19 | Facility: CLINIC | Age: 69
End: 2024-09-04

## 2024-11-13 ENCOUNTER — OFFICE VISIT (OUTPATIENT)
Dept: URBAN - METROPOLITAN AREA CLINIC 19 | Facility: CLINIC | Age: 69
End: 2024-11-13